# Patient Record
Sex: FEMALE | Race: BLACK OR AFRICAN AMERICAN | URBAN - METROPOLITAN AREA
[De-identification: names, ages, dates, MRNs, and addresses within clinical notes are randomized per-mention and may not be internally consistent; named-entity substitution may affect disease eponyms.]

---

## 2017-02-19 ENCOUNTER — EMERGENCY (EMERGENCY)
Facility: HOSPITAL | Age: 32
LOS: 1 days | Discharge: HOME | End: 2017-02-19
Attending: EMERGENCY MEDICINE | Admitting: EMERGENCY MEDICINE
Payer: COMMERCIAL

## 2017-02-19 VITALS
TEMPERATURE: 98 F | OXYGEN SATURATION: 100 % | DIASTOLIC BLOOD PRESSURE: 83 MMHG | WEIGHT: 195.33 LBS | HEART RATE: 74 BPM | RESPIRATION RATE: 16 BRPM | SYSTOLIC BLOOD PRESSURE: 109 MMHG

## 2017-02-19 VITALS
OXYGEN SATURATION: 98 % | TEMPERATURE: 98 F | DIASTOLIC BLOOD PRESSURE: 70 MMHG | RESPIRATION RATE: 16 BRPM | HEART RATE: 59 BPM | SYSTOLIC BLOOD PRESSURE: 118 MMHG

## 2017-02-19 DIAGNOSIS — R00.0 TACHYCARDIA, UNSPECIFIED: ICD-10-CM

## 2017-02-19 DIAGNOSIS — R05 COUGH: ICD-10-CM

## 2017-02-19 DIAGNOSIS — R07.1 CHEST PAIN ON BREATHING: ICD-10-CM

## 2017-02-19 LAB
APPEARANCE UR: CLEAR — SIGNIFICANT CHANGE UP
BILIRUB UR-MCNC: NEGATIVE — SIGNIFICANT CHANGE UP
COLOR SPEC: YELLOW — SIGNIFICANT CHANGE UP
DIFF PNL FLD: NEGATIVE — SIGNIFICANT CHANGE UP
GLUCOSE UR QL: NEGATIVE — SIGNIFICANT CHANGE UP
KETONES UR-MCNC: NEGATIVE — SIGNIFICANT CHANGE UP
LEUKOCYTE ESTERASE UR-ACNC: NEGATIVE — SIGNIFICANT CHANGE UP
NITRITE UR-MCNC: NEGATIVE — SIGNIFICANT CHANGE UP
PH UR: 7.5 — SIGNIFICANT CHANGE UP (ref 4–8)
PROT UR-MCNC: NEGATIVE MG/DL — SIGNIFICANT CHANGE UP
SP GR SPEC: 1.01 — SIGNIFICANT CHANGE UP (ref 1–1.03)
UROBILINOGEN FLD QL: 0.2 E.U./DL — SIGNIFICANT CHANGE UP

## 2017-02-19 PROCEDURE — 99283 EMERGENCY DEPT VISIT LOW MDM: CPT | Mod: 25

## 2017-02-19 PROCEDURE — 96372 THER/PROPH/DIAG INJ SC/IM: CPT

## 2017-02-19 PROCEDURE — 81003 URINALYSIS AUTO W/O SCOPE: CPT

## 2017-02-19 PROCEDURE — 71020: CPT | Mod: 26

## 2017-02-19 PROCEDURE — 71046 X-RAY EXAM CHEST 2 VIEWS: CPT

## 2017-02-19 PROCEDURE — 99284 EMERGENCY DEPT VISIT MOD MDM: CPT | Mod: 25

## 2017-02-19 RX ORDER — CYCLOBENZAPRINE HYDROCHLORIDE 10 MG/1
1 TABLET, FILM COATED ORAL
Qty: 12 | Refills: 0 | OUTPATIENT
Start: 2017-02-19 | End: 2017-02-23

## 2017-02-19 RX ORDER — IBUPROFEN 200 MG
1 TABLET ORAL
Qty: 12 | Refills: 0 | OUTPATIENT
Start: 2017-02-19 | End: 2017-02-23

## 2017-02-19 RX ORDER — KETOROLAC TROMETHAMINE 30 MG/ML
60 SYRINGE (ML) INJECTION ONCE
Qty: 0 | Refills: 0 | Status: DISCONTINUED | OUTPATIENT
Start: 2017-02-19 | End: 2017-02-19

## 2017-02-19 RX ADMIN — Medication 60 MILLIGRAM(S): at 09:20

## 2017-02-19 RX ADMIN — Medication 60 MILLIGRAM(S): at 09:50

## 2017-02-19 NOTE — ED PROVIDER NOTE - OBJECTIVE STATEMENT
The pt is a 32 y/o F, who presents to ED c/o R sided cp since yest. Pt states that has had a cough/cold x 1 wk, yest started to feel pain to R side of chest, pain w/deep inspiration and mov of r arm, pain non radiating, 10/10, took tyl w/o relief. No trauma, no OCPs, non smoker. Denies fevers, chills, palpitations, sob, n/v, leg pain or swelling, recent prolonged travel

## 2017-02-19 NOTE — ED PROVIDER NOTE - ATTENDING CONTRIBUTION TO CARE
right flank/back pain for past few days with movement/deep breath.  no fever, sob.  had been coughing recently.  cxr neg.  well appearing.  reproduced with bending forward.  abd soft, non tender.  suspect muscle strain.  perc neg for pe

## 2017-02-19 NOTE — ED ADULT NURSE NOTE - OBJECTIVE STATEMENT
Patient presents to the ED for left sided flank pain, reports that it is sharp, and constant. Increased pain with respirations and unable to bend down without having increased pain. Recently sick with a cold. No recent travel. No urinary symptoms. Patient presents to the ED for left sided flank pain, reports that it is sharp, and constant. Increased pain with respirations and unable to bend down without having increased pain since yesterday. Recently sick with a cold where she had fever, chills. No recent travel. No urinary symptoms. Took tylenol at home without relief of symptoms. No chest pain.

## 2017-02-19 NOTE — ED PROVIDER NOTE - RESPIRATORY, MLM
Breath sounds clear and equal bilaterally. no rales, no rhonchi, no wheezes b/l. no rash / lesions to chest wall. speaking in long / full sentences. pain reproducible w/ROM of R UE and deep inspiration

## 2017-02-19 NOTE — ED ADULT NURSE NOTE - CHIEF COMPLAINT QUOTE
had a cold with cough prior to this episode of right back/flank pain - hurts to take deep breath as of yesterday - took Tylenol but did not really help ; denies other symptoms or injury; no BCP  or recent long seated travel

## 2017-02-19 NOTE — ED ADULT TRIAGE NOTE - CHIEF COMPLAINT QUOTE
had a cold with cough prior to this episode of right back/flank pain - hurts to take deep breath; denies other symptoms or injury; no BCP  or recent long seated travel had a cold with cough prior to this episode of right back/flank pain - hurts to take deep breath as of yesterday - took Tylenol but did not really help ; denies other symptoms or injury; no BCP  or recent long seated travel

## 2017-02-19 NOTE — ED PROVIDER NOTE - MEDICAL DECISION MAKING DETAILS
pt w/R side cp w/inspiration or ROM, has had a cough x 1 wk, no SOB, no PE risk factors and no hypoxia, tachycardia or tachypnea, pain reproducible w/deep inspiration and mov, cxr clear, given toradol w/relief. likely pleuritic vs muscular, to con't nsaids and avoid straining chest wall, f/u w/pmd, pt understands and agrees w/plan

## 2023-06-20 ENCOUNTER — APPOINTMENT (OUTPATIENT)
Dept: FAMILY MEDICINE | Facility: CLINIC | Age: 38
End: 2023-06-20

## 2023-06-20 PROBLEM — Z00.00 ENCOUNTER FOR PREVENTIVE HEALTH EXAMINATION: Status: ACTIVE | Noted: 2023-06-20

## 2023-10-20 ENCOUNTER — APPOINTMENT (OUTPATIENT)
Dept: PAIN MANAGEMENT | Facility: CLINIC | Age: 38
End: 2023-10-20